# Patient Record
Sex: FEMALE | Employment: FULL TIME | URBAN - METROPOLITAN AREA
[De-identification: names, ages, dates, MRNs, and addresses within clinical notes are randomized per-mention and may not be internally consistent; named-entity substitution may affect disease eponyms.]

---

## 2018-08-16 ENCOUNTER — HOSPITAL ENCOUNTER (EMERGENCY)
Facility: CLINIC | Age: 18
Discharge: HOME | End: 2018-08-17
Attending: EMERGENCY MEDICINE | Admitting: EMERGENCY MEDICINE

## 2018-08-16 DIAGNOSIS — K29.00 ACUTE SUPERFICIAL GASTRITIS WITHOUT HEMORRHAGE: ICD-10-CM

## 2018-08-16 LAB
ALBUMIN UR-MCNC: NEGATIVE MG/DL
APPEARANCE UR: CLEAR
BILIRUB UR QL STRIP: NEGATIVE
COLOR UR AUTO: NORMAL
GLUCOSE UR STRIP-MCNC: NEGATIVE MG/DL
HCG UR QL: NEGATIVE
HGB UR QL STRIP: NEGATIVE
KETONES UR STRIP-MCNC: NEGATIVE MG/DL
LEUKOCYTE ESTERASE UR QL STRIP: NEGATIVE
NITRATE UR QL: NEGATIVE
PH UR STRIP: 5.5 PH (ref 5–7)
SOURCE: NORMAL
SP GR UR STRIP: 1.01 (ref 1–1.03)
UROBILINOGEN UR STRIP-MCNC: NORMAL MG/DL (ref 0–2)

## 2018-08-16 PROCEDURE — 81003 URINALYSIS AUTO W/O SCOPE: CPT | Performed by: EMERGENCY MEDICINE

## 2018-08-16 PROCEDURE — 83690 ASSAY OF LIPASE: CPT | Performed by: EMERGENCY MEDICINE

## 2018-08-16 PROCEDURE — 80053 COMPREHEN METABOLIC PANEL: CPT | Performed by: EMERGENCY MEDICINE

## 2018-08-16 PROCEDURE — 99284 EMERGENCY DEPT VISIT MOD MDM: CPT | Mod: 25

## 2018-08-16 PROCEDURE — 85025 COMPLETE CBC W/AUTO DIFF WBC: CPT | Performed by: EMERGENCY MEDICINE

## 2018-08-16 PROCEDURE — 81025 URINE PREGNANCY TEST: CPT | Performed by: EMERGENCY MEDICINE

## 2018-08-16 RX ORDER — ONDANSETRON 2 MG/ML
4 INJECTION INTRAMUSCULAR; INTRAVENOUS EVERY 30 MIN PRN
Status: DISCONTINUED | OUTPATIENT
Start: 2018-08-16 | End: 2018-08-17 | Stop reason: HOSPADM

## 2018-08-16 ASSESSMENT — ENCOUNTER SYMPTOMS
NAUSEA: 1
DYSURIA: 0
ABDOMINAL PAIN: 1
VOMITING: 1

## 2018-08-16 NOTE — ED AVS SNAPSHOT
Emergency Department    [address] [facility] [address]    Phone:  [phone]    Fax:  [phone]                                       Trang Li   MRN: [id number]    Department:   Emergency Department   Date of Visit:  8/16/2018           After Visit Summary Signature Page     I have received my discharge instructions, and my questions have been answered. I have discussed any challenges I see with this plan with the nurse or doctor.    ..........................................................................................................................................  Patient/Patient Representative Signature      ..........................................................................................................................................  Patient Representative Print Name and Relationship to Patient    ..................................................               ................................................  Date                                            Time    ..........................................................................................................................................  Reviewed by Signature/Title    ...................................................              ..............................................  Date                                                            Time

## 2018-08-16 NOTE — ED AVS SNAPSHOT
Emergency Department    [address] [facility] [address]    Phone:  [phone]    Fax:  [phone]                                       Trang Li   MRN: [id number]    Department:   Emergency Department   Date of Visit:  8/16/2018           Patient Information     Date Of Birth          2000        Your diagnoses for this visit were:     Acute superficial gastritis without hemorrhage        You were seen by Maryam Abernathy MD.      Follow-up Information     Follow up with [facility].    Specialty:  Clinic    Contact information:    [address]  [facility] [address] [phone]          Schedule an appointment as soon as possible for a visit with Catrachito Vasquez MD.    Specialty:  Gastroenterology    Contact information:    [facility]  [address] [place] [address] [address] [place] [address] [phone]          Follow up with  Emergency Department.    Specialty:  EMERGENCY MEDICINE    Why:  If symptoms worsen    Contact information:    [address] [facility] [address] [phone]        Discharge Instructions         Gastritis  [Gastritis, Adult]    La GASTRITIS es sally irritación del recubrimiento del estómago. Puede ser aguda (reciente) o crónica (de tremayne plazo). La puede causar el consumo excesivo de alcohol o medicamentos antiinflamatorios (cristhian la aspirina, el ibuprofeno o la prednisona).  La gastritis suele provocar un ardor doloroso (quemazón) en la parte superior del estómago. Otros síntomas incluyen náuseas, vómito, pérdida del apetito, y eruptos o sensación de inflamiento (abotagamiento). La josefina en el vómito o las heces (de color rojizo o negruzco) es sally señal de sangrado en el estómago. [place] requiere atención médica inmediata.  Las pruebas de detección del H. pylori se usan para comprobar si hay sally infección bacteriana. Si no se encuentra sally infección, la gastritis puede tratarse suspendiendo la causa y  tomando antiácidos más un bloqueador de ácido. Si se encuentra la infección por el H. pylori, también se recetarán antibióticos. A las personas de 55 años o más se les podría hacer otras pruebas antes de iniciar el tratamiento.  Se utilizan dos pruebas comunes para evaluar los síntomas. Sally serie gastrointestinal superior que consiste en sally radiografía que se monica después de veronica bebido un líquido de consistencia similar a la tiza (giz) llamado bario. Liliana recubre al estómago y permite que el médico brittani en la radiografía si existe algún problema. Otra prueba se llama endoscopia, en la que un tubo davis llamado endoscopio es introducido por la boca y la garganta hasta el estómago para detectar la causa de los síntomas.  Cuidados En [place]:    [place] todo el ciclo del medicamento bloqueador de ácido aunque antes comience a sentirse mejor. Liliana medicamento puede tardar varios días para controlar por completo los síntomas. Si no puede pagar el medicamento recetado, pruebe nikolas de los bloqueadores de ácido de venta anila, tales cristhian Pepcid AC, Tagamet, Zantac o Aciphex. Si estos no alivian marylou síntomas, debe probarse un bloqueador de ácido más tramaine, lacey cristhian Prilosec OTC.    Si le knowles recetado antibióticos para tratar la infección por H. pylori, termine el ciclo completo del medicamento. Hágalo aunque se esté sintiendo mejor. Si usted suspende el medicamento demasiado pronto, la infección puede regresar y ser más difícil de tratar.      Para controlar el dolor, puede usar antiácidos, tales cristhian Tums, Rolaids, Mylanta o Maalox. Estos pueden ser útiles los primeros días después de iniciar los bloqueadores de ácido, cuando aún no knowles comenzado a surtir efecto. Siga las instrucciones de la etiqueta. Los antiácidos líquidos pueden funcionar mejor que los de tableta. Tenga en cuenta que los antiácidos pueden interferir con la absorción de ciertos medicamentos. Específicamente, no tome Tagamet (cimetidina), Zantac  (ranitidina) o Carafate (sucralfato) antes de 1 hora de veronica tomado un antiácido. Consulte con bansal farmacéutico si tiene alguna inquietud.    Los síntomas de la gastritis pueden empeorar si se comen ciertos alimentos. Evite las comidas grasosas, fritas y muy condimentadas, el café, el chocolate, la menta y las comidas con alto contenido de ácido: tomates, cítricos (naranja, edu, toronja [pomelo]).    Evite el alcohol, la cafeína y el tabaco que pueden retrasar la mejoría.    Evite la aspirina o los medicamentos antiinflamatorios tales cristhian ibuprofeno [Advil o Motrin] y naproxeno [Naprosyn o Aleve]. Puede usar acetaminofén [Tylenol] sin problemas. No tome más de la cantidad que se recomienda en la etiqueta.   Programe sally VISITA DE CONTROL con bansal médico o según le indique nuestro personal. Podrían necesitarse otras pruebas. Si no empieza a sentirse mejor en los próximos 4 días, comuníquese con bansal médico. Si le hicieron sally radiografía, sally tomografía computarizada (CT scan) o un electroencefalograma (ECG), mireya será revisado por un especialista. Se le notificará si se encuentra algo nuevo que afecte la atención que recibe.  Busque Prontamente Atención Médica  si algo de lo siguiente ocurre:    El dolor de estómago aumenta o se transfiere al lado derecho inferior del abdomen (janet del apéndice).    Se presenta dolor en el pecho, o si el dolor empeora o se extiende a la espalda, el andrez, el hombro o el brazo.    Vómito frecuente (no puede retener líquidos en el estómago).    Ignacio en la materia fecal o el vómito (de color negruzco o rojizo).    Se siente débil o mareado, se desmayó o tiene dificultades para respirar.    Fiebre de 100.4 F (38 C) o más amelia, o cristhian le haya indicado bansal proveedor de atención médica.  Date Last Reviewed: 2/6/2012 2000-2017 [employer]. [address] [place] [address], [place] [address]. Todos los derechos reservados. Esta información no pretende sustituir la atención médica  profesional. Sólo bansal médico puede diagnosticar y tratar un problema de jayashree.          24 Hour Appointment Hotline       To make an appointment at any [facility], call [phone] ([phone]). If you don't have a family doctor or clinic, we will help you find one. [facility] clinics are conveniently located to serve the needs of you and your family.             Review of your medicines      START taking        Dose / Directions Last dose taken    pantoprazole 40 MG EC tablet   Commonly known as:  PROTONIX   Dose:  40 mg   Quantity:  30 tablet        Take 1 tablet (40 mg) by mouth daily for 30 doses   Refills:  0        sucralfate 1 GM/10ML suspension   Commonly known as:  CARAFATE   Dose:  1 g   Quantity:  420 mL        Take 10 mLs (1 g) by mouth 4 times daily   Refills:  1          Our records show that you are taking the medicines listed below. If these are incorrect, please call your family doctor or clinic.        Dose / Directions Last dose taken    clindamycin 75 MG/5ML solution   Commonly known as:  CLEOCIN   Dose:  13 mg/kg/day   Quantity:  450 mL        Take 15 mLs by mouth 3 times daily.   Refills:  0        ibuprofen 100 MG/5ML suspension   Commonly known as:  ADVIL/MOTRIN   Dose:  500 mg   Quantity:  100 mL        Take 25 mLs by mouth every 6 hours as needed for pain or fever.   Refills:  0        TYLENOL CHILDRENS 160 MG/5ML suspension   Quantity:  1   Generic drug:  acetaminophen        500mg one time   Refills:  0                Prescriptions were sent or printed at these locations (2 Prescriptions)                   Other Prescriptions                Printed at Department/Unit printer (2 of 2)         pantoprazole (PROTONIX) 40 MG EC tablet               sucralfate (CARAFATE) 1 GM/10ML suspension                Procedures and tests performed during your visit     CBC with platelets differential    Comprehensive metabolic panel    HCG qualitative urine    Lipase    UA reflex to Microscopic       Orders Needing Specimen Collection     None      Pending Results     No orders found for last 3 day(s).            Pending Culture Results     No orders found for last 3 day(s).            Pending Results Instructions     If you had any lab results that were not finalized at the time of your Discharge, you can call the ED Lab Result RN at [phone]. You will be contacted by this team for any positive Lab results or changes in treatment. The nurses are available 7 days a week from 10A to 6:30P.  You can leave a message 24 hours per day and they will return your call.        Test Results From Your Hospital Stay        8/17/2018 12:08 AM      Component Results     Component Value Ref Range & Units Status    WBC 11.0 4.0 - 11.0 10e9/L Final    RBC Count 4.64 3.8 - 5.2 10e12/L Final    Hemoglobin 13.0 11.7 - 15.7 g/dL Final    Hematocrit 38.0 35.0 - 47.0 % Final    MCV 82 78 - 100 fl Final    MCH 28.0 26.5 - 33.0 pg Final    MCHC 34.2 31.5 - 36.5 g/dL Final    RDW 13.3 10.0 - 15.0 % Final    Platelet Count 242 150 - 450 10e9/L Final    Diff Method Automated Method  Final    % Neutrophils 53.7 % Final    % Lymphocytes 37.3 % Final    % Monocytes 6.1 % Final    % Eosinophils 2.0 % Final    % Basophils 0.5 % Final    % Immature Granulocytes 0.4 % Final    Nucleated RBCs 0 0 /100 Final    Absolute Neutrophil 5.9 1.6 - 8.3 10e9/L Final    Absolute Lymphocytes 4.1 0.8 - 5.3 10e9/L Final    Absolute Monocytes 0.7 0.0 - 1.3 10e9/L Final    Absolute Eosinophils 0.2 0.0 - 0.7 10e9/L Final    Absolute Basophils 0.1 0.0 - 0.2 10e9/L Final    Abs Immature Granulocytes 0.0 0 - 0.4 10e9/L Final    Absolute Nucleated RBC 0.0  Final         8/17/2018 12:28 AM      Component Results     Component Value Ref Range & Units Status    Sodium 136 133 - 144 mmol/L Final    Potassium 3.5 3.4 - 5.3 mmol/L Final    Chloride 102 94 - 109 mmol/L Final    Carbon Dioxide 25 20 - 32 mmol/L Final    Anion Gap 9 3 - 14 mmol/L Final    Glucose 90  70 - 99 mg/dL Final    Urea Nitrogen 10 7 - 30 mg/dL Final    Creatinine 0.47 (L) 0.52 - 1.04 mg/dL Final    GFR Estimate >90 >60 mL/min/1.7m2 Final    Non  GFR Calc    GFR Estimate If Black >90 >60 mL/min/1.7m2 Final    African American GFR Calc    Calcium 9.1 8.5 - 10.1 mg/dL Final    Bilirubin Total 0.3 0.2 - 1.3 mg/dL Final    Albumin 4.0 3.4 - 5.0 g/dL Final    Protein Total 8.3 6.8 - 8.8 g/dL Final    Alkaline Phosphatase 83 40 - 150 U/L Final    ALT 27 0 - 50 U/L Final    AST 16 0 - 45 U/L Final         8/17/2018 12:26 AM      Component Results     Component Value Ref Range & Units Status    Lipase 157 73 - 393 U/L Final         8/16/2018 11:44 PM      Component Results     Component Value Ref Range & Units Status    HCG Qual Urine Negative NEG^Negative Final    This test is for screening purposes.  Results should be interpreted along with   the clinical picture.  Confirmation testing is available if warranted by   ordering [id number], HCG Quantitative Pregnancy.           8/16/2018 11:59 PM      Component Results     Component Value Ref Range & Units Status    Color Urine Light Yellow  Final    Appearance Urine Clear  Final    Glucose Urine Negative NEG^Negative mg/dL Final    Bilirubin Urine Negative NEG^Negative Final    Ketones Urine Negative NEG^Negative mg/dL Final    Specific Gravity Urine 1.013 1.003 - 1.035 Final    Blood Urine Negative NEG^Negative Final    pH Urine 5.5 5.0 - 7.0 pH Final    Protein Albumin Urine Negative NEG^Negative mg/dL Final    Urobilinogen mg/dL Normal 0.0 - 2.0 mg/dL Final    Nitrite Urine Negative NEG^Negative Final    Leukocyte Esterase Urine Negative NEG^Negative Final    Source Midstream Urine  Final                Clinical Quality Measure: Blood Pressure Screening     Your blood pressure was checked while you were in the emergency department today. The last reading we obtained was  BP: (!) 117/94 . Please read the guidelines below about what these numbers  "mean and what you should do about them.  If your systolic blood pressure (the top number) is less than 120 and your diastolic blood pressure (the bottom number) is less than 80, then your blood pressure is normal. There is nothing more that you need to do about it.  If your systolic blood pressure (the top number) is 120-139 or your diastolic blood pressure (the bottom number) is 80-89, your blood pressure may be higher than it should be. You should have your blood pressure rechecked within a year by a primary care provider.  If your systolic blood pressure (the top number) is 140 or greater or your diastolic blood pressure (the bottom number) is 90 or greater, you may have high blood pressure. High blood pressure is treatable, but if left untreated over time it can put you at risk for heart attack, stroke, or kidney failure. You should have your blood pressure rechecked by a primary care provider within the next 4 weeks.  If your provider in the emergency department today gave you specific instructions to follow-up with your doctor or provider even sooner than that, you should follow that instruction and not wait for up to 4 weeks for your follow-up visit.        Thank you for choosing [facility]       Thank you for choosing [facility] for your care. Our goal is always to provide you with excellent care. Hearing back from our patients is one way we can continue to improve our services. Please take a few minutes to complete the written survey that you may receive in the mail after you visit with us. Thank you!        [employer] Information     [employer] lets you send messages to your doctor, view your test results, renew your prescriptions, schedule appointments and more. To sign up, go to [email or web address] . Click on \"Log in\" on the left side of the screen, which will take you to the Welcome page. Then click on \"Sign up Now\" on the right side of the page.     You will be asked to enter the access code listed below, " as well as some personal information. Please follow the directions to create your username and password.     Your access code is: [email or web address]  Expires: 11/15/2018 12:54 AM     Your access code will  in 90 days. If you need help or a new code, please call your [facility] clinic or [phone].        Care EveryWhere ID     This is your Care EveryWhere ID. This could be used by other organizations to access your [facility] medical records  [id number]        Equal Access to Services     TAIWO ALEXANDRE : Olivia raygozao Soabimael, wamichael luqadaha, qaybaisha kaalmarajesh esquivel, stephanie abad . So [facility] [phone].    ATENCIÓN: Si habla español, tiene a bansal disposición servicios gratuitos de asistencia lingüística. Llame al [phone].    We comply with applicable federal civil rights laws and [place] laws. We do not discriminate on the basis of race, color, national origin, age, disability, sex, sexual orientation, or gender identity.            After Visit Summary       This is your record. Keep this with you and show to your community pharmacist(s) and doctor(s) at your next visit.

## 2018-08-16 NOTE — LETTER
EMERGENCY DEPARTMENT  [address] [facility] [address]  Phone: [phone]  Fax: [phone]    August 17, 2018        Trang Li  [address] [facility] [address]          To whom it may concern:    RE: Trang Saxena Guillermo    Trang was seen in the emergency department on 8/16 into 8/17. She is expected to improve over the next 1-2 days and may return to work when improved.    Please contact me for questions or concerns.      Sincerely,        Radha Gillespie RN

## 2018-08-17 VITALS
TEMPERATURE: 98.2 F | DIASTOLIC BLOOD PRESSURE: 94 MMHG | HEART RATE: 74 BPM | RESPIRATION RATE: 16 BRPM | SYSTOLIC BLOOD PRESSURE: 117 MMHG | OXYGEN SATURATION: 100 %

## 2018-08-17 LAB
ALBUMIN SERPL-MCNC: 4 G/DL (ref 3.4–5)
ALP SERPL-CCNC: 83 U/L (ref 40–150)
ALT SERPL W P-5'-P-CCNC: 27 U/L (ref 0–50)
ANION GAP SERPL CALCULATED.3IONS-SCNC: 9 MMOL/L (ref 3–14)
AST SERPL W P-5'-P-CCNC: 16 U/L (ref 0–45)
BASOPHILS # BLD AUTO: 0.1 10E9/L (ref 0–0.2)
BASOPHILS NFR BLD AUTO: 0.5 %
BILIRUB SERPL-MCNC: 0.3 MG/DL (ref 0.2–1.3)
BUN SERPL-MCNC: 10 MG/DL (ref 7–30)
CALCIUM SERPL-MCNC: 9.1 MG/DL (ref 8.5–10.1)
CHLORIDE SERPL-SCNC: 102 MMOL/L (ref 94–109)
CO2 SERPL-SCNC: 25 MMOL/L (ref 20–32)
CREAT SERPL-MCNC: 0.47 MG/DL (ref 0.52–1.04)
DIFFERENTIAL METHOD BLD: NORMAL
EOSINOPHIL # BLD AUTO: 0.2 10E9/L (ref 0–0.7)
EOSINOPHIL NFR BLD AUTO: 2 %
ERYTHROCYTE [DISTWIDTH] IN BLOOD BY AUTOMATED COUNT: 13.3 % (ref 10–15)
GFR SERPL CREATININE-BSD FRML MDRD: >90 ML/MIN/1.7M2
GLUCOSE SERPL-MCNC: 90 MG/DL (ref 70–99)
HCT VFR BLD AUTO: 38 % (ref 35–47)
HGB BLD-MCNC: 13 G/DL (ref 11.7–15.7)
IMM GRANULOCYTES # BLD: 0 10E9/L (ref 0–0.4)
IMM GRANULOCYTES NFR BLD: 0.4 %
LIPASE SERPL-CCNC: 157 U/L (ref 73–393)
LYMPHOCYTES # BLD AUTO: 4.1 10E9/L (ref 0.8–5.3)
LYMPHOCYTES NFR BLD AUTO: 37.3 %
MCH RBC QN AUTO: 28 PG (ref 26.5–33)
MCHC RBC AUTO-ENTMCNC: 34.2 G/DL (ref 31.5–36.5)
MCV RBC AUTO: 82 FL (ref 78–100)
MONOCYTES # BLD AUTO: 0.7 10E9/L (ref 0–1.3)
MONOCYTES NFR BLD AUTO: 6.1 %
NEUTROPHILS # BLD AUTO: 5.9 10E9/L (ref 1.6–8.3)
NEUTROPHILS NFR BLD AUTO: 53.7 %
NRBC # BLD AUTO: 0 10*3/UL
NRBC BLD AUTO-RTO: 0 /100
PLATELET # BLD AUTO: 242 10E9/L (ref 150–450)
POTASSIUM SERPL-SCNC: 3.5 MMOL/L (ref 3.4–5.3)
PROT SERPL-MCNC: 8.3 G/DL (ref 6.8–8.8)
RBC # BLD AUTO: 4.64 10E12/L (ref 3.8–5.2)
SODIUM SERPL-SCNC: 136 MMOL/L (ref 133–144)
WBC # BLD AUTO: 11 10E9/L (ref 4–11)

## 2018-08-17 PROCEDURE — 25000128 H RX IP 250 OP 636: Performed by: EMERGENCY MEDICINE

## 2018-08-17 PROCEDURE — 96361 HYDRATE IV INFUSION ADD-ON: CPT

## 2018-08-17 PROCEDURE — 96374 THER/PROPH/DIAG INJ IV PUSH: CPT

## 2018-08-17 PROCEDURE — 25000132 ZZH RX MED GY IP 250 OP 250 PS 637: Performed by: EMERGENCY MEDICINE

## 2018-08-17 PROCEDURE — 25000125 ZZHC RX 250: Performed by: EMERGENCY MEDICINE

## 2018-08-17 RX ORDER — PANTOPRAZOLE SODIUM 40 MG/1
40 TABLET, DELAYED RELEASE ORAL DAILY
Qty: 30 TABLET | Refills: 0 | Status: SHIPPED | OUTPATIENT
Start: 2018-08-17 | End: 2018-09-16

## 2018-08-17 RX ORDER — SUCRALFATE ORAL 1 G/10ML
1 SUSPENSION ORAL 4 TIMES DAILY
Qty: 420 ML | Refills: 1 | Status: SHIPPED | OUTPATIENT
Start: 2018-08-17 | End: 2019-04-28

## 2018-08-17 RX ADMIN — ONDANSETRON 4 MG: 2 INJECTION INTRAMUSCULAR; INTRAVENOUS at 00:02

## 2018-08-17 RX ADMIN — LIDOCAINE HYDROCHLORIDE 30 ML: 20 SOLUTION ORAL; TOPICAL at 00:04

## 2018-08-17 RX ADMIN — SODIUM CHLORIDE 1000 ML: 9 INJECTION, SOLUTION INTRAVENOUS at 00:01

## 2018-08-17 NOTE — DISCHARGE INSTRUCTIONS
Gastritis  [Gastritis, Adult]    La GASTRITIS es sally irritación del recubrimiento del estómago. Puede ser aguda (reciente) o crónica (de tremayne plazo). La puede causar el consumo excesivo de alcohol o medicamentos antiinflamatorios (cristhian la aspirina, el ibuprofeno o la prednisona).  La gastritis suele provocar un ardor doloroso (quemazón) en la parte superior del estómago. Otros síntomas incluyen náuseas, vómito, pérdida del apetito, y eruptos o sensación de inflamiento (abotagamiento). La josefina en el vómito o las heces (de color rojizo o negruzco) es sally señal de sangrado en el estómago. [place] requiere atención médica inmediata.  Las pruebas de detección del H. pylori se usan para comprobar si hay sally infección bacteriana. Si no se encuentra sally infección, la gastritis puede tratarse suspendiendo la causa y tomando antiácidos más un bloqueador de ácido. Si se encuentra la infección por el H. pylori, también se recetarán antibióticos. A las personas de 55 años o más se les podría hacer otras pruebas antes de iniciar el tratamiento.  Se utilizan dos pruebas comunes para evaluar los síntomas. Sally serie gastrointestinal superior que consiste en sally radiografía que se monica después de veronica bebido un líquido de consistencia similar a la tiza (giz) llamado bario. [place] recubre al estómago y permite que el médico brittani en la radiografía si existe algún problema. Otra prueba se llama endoscopia, en la que un tubo davis llamado endoscopio es introducido por la boca y la garganta hasta el estómago para detectar la causa de los síntomas.  Cuidados En [place]:    [place] todo el ciclo del medicamento bloqueador de ácido aunque antes comience a sentirse mejor. [place] medicamento puede tardar varios días para controlar por completo los síntomas. Si no puede pagar el medicamento recetado, pruebe nikolas de los bloqueadores de ácido de venta anila, tales cristhian Pepcid AC, Tagamet, Zantac o Aciphex. Si estos no alivian marylou síntomas, debe  probarse un bloqueador de ácido más tramaine, lacey cristhian Prilosec OTC.    Si le knowles recetado antibióticos para tratar la infección por H. pylori, termine el ciclo completo del medicamento. Hágalo aunque se esté sintiendo mejor. Si usted suspende el medicamento demasiado pronto, la infección puede regresar y ser más difícil de tratar.      Para controlar el dolor, puede usar antiácidos, tales cristhian Tums, Rolaids, Mylanta o Maalox. Estos pueden ser útiles los primeros días después de iniciar los bloqueadores de ácido, cuando aún no knowles comenzado a surtir efecto. Siga las instrucciones de la etiqueta. Los antiácidos líquidos pueden funcionar mejor que los de tableta. Tenga en cuenta que los antiácidos pueden interferir con la absorción de ciertos medicamentos. Específicamente, no tome Tagamet (cimetidina), Zantac (ranitidina) o Carafate (sucralfato) antes de 1 hora de veronica tomado un antiácido. Consulte con bnasal farmacéutico si tiene alguna inquietud.    Los síntomas de la gastritis pueden empeorar si se comen ciertos alimentos. Evite las comidas grasosas, fritas y muy condimentadas, el café, el chocolate, la menta y las comidas con alto contenido de ácido: tomates, cítricos (naranja, edu, toronja [pomelo]).    Evite el alcohol, la cafeína y el tabaco que pueden retrasar la mejoría.    Evite la aspirina o los medicamentos antiinflamatorios tales cristhian ibuprofeno [Advil o Motrin] y naproxeno [Naprosyn o Aleve]. Puede usar acetaminofén [Tylenol] sin problemas. No tome más de la cantidad que se recomienda en la etiqueta.   Programe sally VISITA DE CONTROL con bansal médico o según le indique nuestro personal. Podrían necesitarse otras pruebas. Si no empieza a sentirse mejor en los próximos 4 días, comuníquese con bansal médico. Si le hicieron sally radiografía, sally tomografía computarizada (CT scan) o un electroencefalograma (ECG), mireya será revisado por un especialista. Se le notificará si se encuentra algo nuevo que afecte la atención  que recibe.  Busque Prontamente Atención Médica  si algo de lo siguiente ocurre:    El dolor de estómago aumenta o se transfiere al lado derecho inferior del abdomen (janet del apéndice).    Se presenta dolor en el pecho, o si el dolor empeora o se extiende a la espalda, el andrez, el hombro o el brazo.    Vómito frecuente (no puede retener líquidos en el estómago).    Ignacio en la materia fecal o el vómito (de color negruzco o rojizo).    Se siente débil o mareado, se desmayó o tiene dificultades para respirar.    Fiebre de 100.4 F (38 C) o más amelia, o cristhian le haya indicado bansal proveedor de atención médica.  Date Last Reviewed: 2/6/2012 2000-2017 [employer]. [address] [place] [id number]. Todos los derechos reservados. Esta información no pretende sustituir la atención médica profesional. Sólo bansal médico puede diagnosticar y tratar un problema de jayashree.

## 2018-08-17 NOTE — ED PROVIDER NOTES
History     Chief Complaint:  Abdominal Pain    HPI   Trang Li is a 18 year old female who presents to the emergency department today for evaluation of abdominal pain. The patient started having abdominal pain tonight after eating pork for dinner at 1800. The patient did vomit once, but denies any urinary changes such as dysuria, urgency, and vaginal discharge.  Patient denies diarrhea, fevers, chills.  She denies sick contacts.  She denies recent travel.  She denies recent antibiotics.  She describes the pain as a bloated feeling, and says she gets this pain after eating specific foods she ate tonight normally, but not this bad. She vomited once after eating. There was no blood in her vomit. No others are sick nearby.       Allergies:  No Known Drug Allergies    Medications:    clindamycin (CLEOCIN) 75 MG/5ML solution  ibuprofen (ADVIL,MOTRIN) 100 MG/5ML suspension  TYLENOL CHILDRENS 160 MG/5ML OR SUSP    Past Medical History:    History reviewed. No pertinent medical history.    Past Surgical History:    History reviewed. No pertinent surgical history.    Family History:    History reviewed. No pertinent family history.    Social History:  The patient was accompanied to the ED by her family.  Smoking Status: Never Smoker  Smokeless Tobacco: Never Used  Alcohol Use: Negative   Marital Status:  Single     Review of Systems   Gastrointestinal: Positive for abdominal pain, nausea and vomiting.   Genitourinary: Negative for dysuria, urgency and vaginal discharge.   All other systems reviewed and are negative.    Physical Exam     Patient Vitals for the past 24 hrs:   BP Temp Pulse Resp SpO2   08/17/18 0030 (!) 117/94 - 71 16 100 %   08/17/18 0000 (!) 134/101 - 77 - 100 %   08/16/18 2337 - - 76 16 100 %   08/16/18 2335 (!) 131/94 - - - -   08/16/18 2146 140/86 98.2  F (36.8  C) - 14 100 %      Physical Exam  General: Patient is alert and normal appearing.  HEENT: Head atraumatic    Eyes: pupils equal  and reactive. Conjunctiva clear   Nares: patent   Oropharynx: no lesions, uvula midline, no palatal draping, normal voice, no trismus  Neck: Supple without lymphadenopathy, no meningismus  Chest: Heart regular rate and rhythm.   Lungs: Equal clear to auscultation with no wheeze or rales  Abdomen: Soft, minimal epigastric tenderness to palpation, no rebound or guarding, nondistended, normal bowel sounds  Back: No costovertebral angle tenderness, no midline C, T or L spine tenderness  Neuro: Grossly nonfocal, normal speech, strength equal bilaterally, CN 2-12 intact  Extremities: No deformities, equal radial and DP pulses. No clubbing, cyanosis.  No edema  Skin: Warm and dry with no rash.       Emergency Department Course     Laboratory:  Laboratory findings were communicated with the patient who voiced understanding of the findings.    CBC: WBC 11.0, HGB 13.0,   CMP: Creatinine 0.47  Lipase: 157  HCG: Negative  UA: Negative    Interventions:  0001 NS, 1 L, IV   0002 Zofran 4 mg IV  0004 GI Cocktail (Maalox/Mylanta and viscous Lidocaine), 30 mL suspension, PO      Emergency Department Course:    2325 Nursing notes and vitals reviewed.    2330 I performed an exam of the patient as documented above.     2334 The patient provided a urine sample here in the emergency department. This was sent for laboratory testing, findings above.     2358 IV was inserted and blood was drawn for laboratory testing, results above.     0048 I personally reviewed the lab results with the patient and answered all related questions prior to discharge.    Impression & Plan      Medical Decision Making:  Trang Li is a 18 year old who presents for evaluation of epigastric abdominal pain and vomiting. I considered a broad differential diagnosis for this patient including gastritis, GERD, cholecystitis, choledocolithiasis, biliary colic, pancreatitis, colonic or small bowel pathology, vascular etiologies including aneurysm,  ulcer. Signs and symptoms here are consistent with gastritis. No peritoneal signs. Tolerates PO. Patient experienced relief here with above treatment. There are no signs of any serious etiologies as mentioned above or intraabdominal catastrophes. Doubt perforated ulcer at this point based on exam and history. Follow up with PCP or GI in 7-14 days. Consider endoscopy if further symptoms despite this. Gastritis precautions given for home. Patient will be discharged to home with a prescription for Carafate and Protonix.  Discussed with patient if her pain moves the right lower quadrant, she develops fever, persistent vomiting she needs to return for further workup and this could be an indication of appendicitis.  She expressed agreement of the plan and return precautions were reviewed at length and she expressed understanding of these precautions.    Diagnosis:    ICD-10-CM    1. Acute superficial gastritis without hemorrhage K29.00      Disposition:   Discharge     Discharge Medications:  New Prescriptions    PANTOPRAZOLE (PROTONIX) 40 MG EC TABLET    Take 1 tablet (40 mg) by mouth daily for 30 doses    SUCRALFATE (CARAFATE) 1 GM/10ML SUSPENSION    Take 10 mLs (1 g) by mouth 4 times daily     Scribe Disclosure:  Silver PHILLIPS, am serving as a scribe at 11:36 PM on 8/16/2018 to document services personally performed by Maryam Abernathy MD based on my observations and the provider's statements to me.       EMERGENCY DEPARTMENT       Maryam Abernathy MD  08/17/18 0229

## 2019-04-28 ENCOUNTER — HOSPITAL ENCOUNTER (EMERGENCY)
Facility: CLINIC | Age: 19
Discharge: HOME | End: 2019-04-29
Attending: EMERGENCY MEDICINE | Admitting: EMERGENCY MEDICINE
Payer: COMMERCIAL

## 2019-04-28 DIAGNOSIS — N92.1 MENOMETRORRHAGIA: ICD-10-CM

## 2019-04-28 DIAGNOSIS — R51.9 NONINTRACTABLE HEADACHE, UNSPECIFIED CHRONICITY PATTERN, UNSPECIFIED HEADACHE TYPE: ICD-10-CM

## 2019-04-28 PROCEDURE — 96361 HYDRATE IV INFUSION ADD-ON: CPT | Performed by: EMERGENCY MEDICINE

## 2019-04-28 PROCEDURE — 99284 EMERGENCY DEPT VISIT MOD MDM: CPT | Mod: 25 | Performed by: EMERGENCY MEDICINE

## 2019-04-28 PROCEDURE — 96375 TX/PRO/DX INJ NEW DRUG ADDON: CPT | Performed by: EMERGENCY MEDICINE

## 2019-04-28 PROCEDURE — 99284 EMERGENCY DEPT VISIT MOD MDM: CPT | Mod: Z6 | Performed by: EMERGENCY MEDICINE

## 2019-04-28 PROCEDURE — 96374 THER/PROPH/DIAG INJ IV PUSH: CPT | Performed by: EMERGENCY MEDICINE

## 2019-04-28 NOTE — LETTER
April 29, 2019      To Whom It May Concern:      Trang Li was seen in our Emergency Department today, 04/29/19.  I expect her condition to improve over the next 1 day.  She may return to work/school when improved.    Sincerely,        Jackelin Webster MD

## 2019-04-29 VITALS
SYSTOLIC BLOOD PRESSURE: 117 MMHG | TEMPERATURE: 98.2 F | HEART RATE: 69 BPM | DIASTOLIC BLOOD PRESSURE: 80 MMHG | OXYGEN SATURATION: 100 % | RESPIRATION RATE: 16 BRPM

## 2019-04-29 LAB
BASOPHILS # BLD AUTO: 0.1 10E9/L (ref 0–0.2)
BASOPHILS NFR BLD AUTO: 0.8 %
DIFFERENTIAL METHOD BLD: NORMAL
EOSINOPHIL # BLD AUTO: 0.2 10E9/L (ref 0–0.7)
EOSINOPHIL NFR BLD AUTO: 2 %
ERYTHROCYTE [DISTWIDTH] IN BLOOD BY AUTOMATED COUNT: 13.4 % (ref 10–15)
HCG UR QL: NEGATIVE
HCT VFR BLD AUTO: 41.6 % (ref 35–47)
HGB BLD-MCNC: 13.5 G/DL (ref 11.7–15.7)
IMM GRANULOCYTES # BLD: 0 10E9/L (ref 0–0.4)
IMM GRANULOCYTES NFR BLD: 0.4 %
INTERNAL QC OK POCT: YES
LYMPHOCYTES # BLD AUTO: 2.9 10E9/L (ref 0.8–5.3)
LYMPHOCYTES NFR BLD AUTO: 32.4 %
MCH RBC QN AUTO: 27.5 PG (ref 26.5–33)
MCHC RBC AUTO-ENTMCNC: 32.5 G/DL (ref 31.5–36.5)
MCV RBC AUTO: 85 FL (ref 78–100)
MONOCYTES # BLD AUTO: 0.4 10E9/L (ref 0–1.3)
MONOCYTES NFR BLD AUTO: 4.7 %
NEUTROPHILS # BLD AUTO: 5.4 10E9/L (ref 1.6–8.3)
NEUTROPHILS NFR BLD AUTO: 59.7 %
NRBC # BLD AUTO: 0 10*3/UL
NRBC BLD AUTO-RTO: 0 /100
PLATELET # BLD AUTO: 294 10E9/L (ref 150–450)
RBC # BLD AUTO: 4.91 10E12/L (ref 3.8–5.2)
WBC # BLD AUTO: 9.1 10E9/L (ref 4–11)

## 2019-04-29 PROCEDURE — 96374 THER/PROPH/DIAG INJ IV PUSH: CPT | Performed by: EMERGENCY MEDICINE

## 2019-04-29 PROCEDURE — 25000132 ZZH RX MED GY IP 250 OP 250 PS 637: Performed by: EMERGENCY MEDICINE

## 2019-04-29 PROCEDURE — 81025 URINE PREGNANCY TEST: CPT | Performed by: EMERGENCY MEDICINE

## 2019-04-29 PROCEDURE — 96361 HYDRATE IV INFUSION ADD-ON: CPT | Performed by: EMERGENCY MEDICINE

## 2019-04-29 PROCEDURE — 96375 TX/PRO/DX INJ NEW DRUG ADDON: CPT | Performed by: EMERGENCY MEDICINE

## 2019-04-29 PROCEDURE — 25000128 H RX IP 250 OP 636: Performed by: EMERGENCY MEDICINE

## 2019-04-29 PROCEDURE — 85025 COMPLETE CBC W/AUTO DIFF WBC: CPT | Performed by: EMERGENCY MEDICINE

## 2019-04-29 RX ORDER — METOCLOPRAMIDE HYDROCHLORIDE 5 MG/ML
10 INJECTION INTRAMUSCULAR; INTRAVENOUS ONCE
Status: COMPLETED | OUTPATIENT
Start: 2019-04-29 | End: 2019-04-29

## 2019-04-29 RX ORDER — KETOROLAC TROMETHAMINE 30 MG/ML
30 INJECTION, SOLUTION INTRAMUSCULAR; INTRAVENOUS ONCE
Status: COMPLETED | OUTPATIENT
Start: 2019-04-29 | End: 2019-04-29

## 2019-04-29 RX ORDER — DIPHENHYDRAMINE HCL 25 MG
25 CAPSULE ORAL ONCE
Status: COMPLETED | OUTPATIENT
Start: 2019-04-29 | End: 2019-04-29

## 2019-04-29 RX ADMIN — KETOROLAC TROMETHAMINE 30 MG: 30 INJECTION, SOLUTION INTRAMUSCULAR at 01:08

## 2019-04-29 RX ADMIN — DIPHENHYDRAMINE HYDROCHLORIDE 25 MG: 25 CAPSULE ORAL at 01:08

## 2019-04-29 RX ADMIN — SODIUM CHLORIDE 1000 ML: 9 INJECTION, SOLUTION INTRAVENOUS at 01:14

## 2019-04-29 RX ADMIN — METOCLOPRAMIDE 10 MG: 5 INJECTION, SOLUTION INTRAMUSCULAR; INTRAVENOUS at 01:08

## 2019-04-29 ASSESSMENT — ENCOUNTER SYMPTOMS
LIGHT-HEADEDNESS: 1
WEAKNESS: 0
ABDOMINAL PAIN: 0
NUMBNESS: 0
SPEECH DIFFICULTY: 0
FEVER: 0
SHORTNESS OF BREATH: 0
VOMITING: 0
HEADACHES: 1
NAUSEA: 1
COUGH: 0

## 2019-04-29 NOTE — ED TRIAGE NOTES
Headache since yesterday, taking Ibuprofen with no relief. Dizziness started a couple hours ago. At [employer] and felt lightheaded. Last ate/drank four hours ago.

## 2019-04-29 NOTE — DISCHARGE INSTRUCTIONS
Please make an appointment to follow up with Your Primary Care Provider next week with any concerns. Return to the ER with any new or worsening concerns.

## 2019-04-29 NOTE — ED PROVIDER NOTES
[facility] EMERGENCY DEPARTMENT ([facility])    4/28/19       History     Chief Complaint   Patient presents with     Headache     headache since yesterday.      The history is provided by the patient and medical records.   Headache   Associated symptoms: nausea    Associated symptoms: no abdominal pain, no cough, no fever, no numbness, no vomiting and no weakness      Trang Li is a 18 year old female who presents today to the Emergency Department with complaint of frontal and bitemporal throbbing headache for a little over 24 hours.  States it is gradually worsening over time.  No blurred vision, slurred speech, numbness, weakness.  She has had a little bit of nausea but no vomiting.  She felt lightheaded today.  She also states her menses started the day prior to the headache and has been heavier than usual for her.  She is using about 5 pads or tampons a day.  She states her menses came at 2 weeks late, though she denies concern for pregnancy.  No fever, cough, shortness of breath.  No abdominal pain.  No recent head trauma.  She did check to try ibuprofen without much improvement.  No other specific complaints.      This part of the medical record was transcribed by Martin Grover Medical Scribe.     I have reviewed the Medications, Allergies, Past Medical and Surgical History, and Social History in the [employer] system.    Past Medical History:   Diagnosis Date     Depressive disorder, not elsewhere classified        History reviewed. No pertinent surgical history.    History reviewed. No pertinent family history.    Social History     Tobacco Use     Smoking status: Never Smoker     Smokeless tobacco: Never Used     Tobacco comment: not around anyone at home that smokes   Substance Use Topics     Alcohol use: No       No current facility-administered medications for this encounter.      Current Outpatient Medications   Medication     ibuprofen (ADVIL,MOTRIN) 100 MG/5ML suspension         Allergies   Allergen Reactions     No Known Drug Allergies         Review of Systems   Constitutional: Negative for fever.   Eyes: Negative for visual disturbance.   Respiratory: Negative for cough and shortness of breath.    Gastrointestinal: Positive for nausea. Negative for abdominal pain and vomiting.   Genitourinary: Positive for menstrual problem.   Neurological: Positive for light-headedness and headaches. Negative for speech difficulty, weakness and numbness.     Physical Exam   BP: (!) 131/98  Pulse: 69  Heart Rate: 79  Temp: 98.3  F (36.8  C)  Resp: 16  SpO2: 100 %      Physical Exam   Constitutional: No distress.   HENT:   Head: Atraumatic.   Mouth/Throat: Oropharynx is clear and moist. No oropharyngeal exudate.   Eyes: Pupils are equal, round, and reactive to light. No scleral icterus.   Neck: Neck supple.   Cardiovascular: Normal heart sounds and intact distal pulses.   Pulmonary/Chest: Breath sounds normal. No respiratory distress.   Abdominal: Soft. There is no tenderness.   Musculoskeletal: She exhibits no edema or tenderness.   Neurological: She is alert. No cranial nerve deficit or sensory deficit. She exhibits normal muscle tone. Coordination normal.   Skin: Skin is warm. No rash noted. She is not diaphoretic.       ED Course        Procedures             Critical Care time:  none             Labs Ordered and Resulted from Time of ED Arrival Up to the Time of Departure from the ED   HCG QUAL URINE POCT - Normal   CBC WITH PLATELETS DIFFERENTIAL            Assessments & Plan (with Medical Decision Making)   The patient's headache was gradual in onset.  I do not have suspicion for subarachnoid hemorrhage.  There is been no fever, her neck is supple, this is not all consistent with meningitis.  Her neurologic exam is unremarkable.  I do think this is a benign headache.  She states that her menses is 2 weeks late and is heavier than normal.  UPT was negative.  Patient's exam is otherwise generally  unremarkable.  She only used 5 pads/tampons today, hemoglobin is normal.  She was given Toradol, IV fluid, Reglan and Benadryl with complete resolution of her headache.  I did recommend she follow-up with her clinic doctor this week.  She is encouraged to return to the ER if new or worsening symptoms.  She verbalizes understanding is agreeable to the plan.    Dictation Disclaimer: Some of this Note has been completed with voice-recognition dictation software. Although errors are generally corrected real-time, there is the potential for a rare error to be present in the completed chart.      I have reviewed the nursing notes.    I have reviewed the findings, diagnosis, plan and need for follow up with the patient.       Medication List      There are no discharge medications for this visit.         Final diagnoses:   Nonintractable headache, unspecified chronicity pattern, unspecified headache type   Menometrorrhagia       4/28/2019   [facility], [place], EMERGENCY DEPARTMENT     Jackelin Webster MD  04/29/19 0232

## 2025-06-28 NOTE — ED AVS SNAPSHOT
[facility], [place], Emergency Department  [address]  [facility] [address]  Phone:  [phone]  Fax:  [phone]                                    Trang Li   MRN: [id number]    Department:  [facility], Emergency Department   Date of Visit:  4/28/2019           After Visit Summary Signature Page    I have received my discharge instructions, and my questions have been answered. I have discussed any challenges I see with this plan with the nurse or doctor.    ..........................................................................................................................................  Patient/Patient Representative Signature      ..........................................................................................................................................  Patient Representative Print Name and Relationship to Patient    ..................................................               ................................................  Date                                   Time    ..........................................................................................................................................  Reviewed by Signature/Title    ...................................................              ..............................................  Date                                               Time          22EPIC Rev 08/18       
no bleeding/no drainage/no pain